# Patient Record
Sex: FEMALE | Race: BLACK OR AFRICAN AMERICAN | URBAN - METROPOLITAN AREA
[De-identification: names, ages, dates, MRNs, and addresses within clinical notes are randomized per-mention and may not be internally consistent; named-entity substitution may affect disease eponyms.]

---

## 2020-07-07 ENCOUNTER — OFFICE VISIT (OUTPATIENT)
Dept: URBAN - METROPOLITAN AREA TELEHEALTH 2 | Facility: TELEHEALTH | Age: 45
End: 2020-07-07

## 2020-07-07 RX ORDER — OMEPRAZOLE 10 MG/1
TAKE 1 CAPSULE BY MOUTH TWICE A DAY CAPSULE, DELAYED RELEASE ORAL
Qty: 60 | Refills: 3 | Status: ACTIVE | COMMUNITY
Start: 2020-05-28

## 2020-12-10 ENCOUNTER — TELEPHONE ENCOUNTER (OUTPATIENT)
Dept: URBAN - METROPOLITAN AREA CLINIC 92 | Facility: CLINIC | Age: 45
End: 2020-12-10

## 2020-12-11 ENCOUNTER — ERX REFILL RESPONSE (OUTPATIENT)
Age: 45
End: 2020-12-11

## 2020-12-11 RX ORDER — OMEPRAZOLE 10 MG/1
TAKE 1 CAPSULE BY MOUTH TWICE A DAY CAPSULE, DELAYED RELEASE ORAL
Qty: 60 | Refills: 3

## 2020-12-15 ENCOUNTER — DASHBOARD ENCOUNTERS (OUTPATIENT)
Age: 45
End: 2020-12-15

## 2020-12-15 ENCOUNTER — OFFICE VISIT (OUTPATIENT)
Dept: URBAN - METROPOLITAN AREA TELEHEALTH 2 | Facility: TELEHEALTH | Age: 45
End: 2020-12-15
Payer: MEDICARE

## 2020-12-15 DIAGNOSIS — R19.7 DIARRHEA, UNSPECIFIED TYPE: ICD-10-CM

## 2020-12-15 DIAGNOSIS — R10.13 EPIGASTRIC PAIN: ICD-10-CM

## 2020-12-15 DIAGNOSIS — R53.82 CHRONIC FATIGUE: ICD-10-CM

## 2020-12-15 DIAGNOSIS — Z98.84 BARIATRIC SURGERY STATUS: ICD-10-CM

## 2020-12-15 PROBLEM — 84229001: Status: ACTIVE | Noted: 2020-12-15

## 2020-12-15 PROBLEM — 77692006: Status: ACTIVE | Noted: 2020-12-15

## 2020-12-15 PROBLEM — 62315008: Status: ACTIVE | Noted: 2020-12-15

## 2020-12-15 PROBLEM — 287006005: Status: ACTIVE | Noted: 2020-12-15

## 2020-12-15 PROBLEM — 79922009: Status: ACTIVE | Noted: 2020-12-15

## 2020-12-15 PROCEDURE — 99214 OFFICE O/P EST MOD 30 MIN: CPT | Performed by: INTERNAL MEDICINE

## 2020-12-15 RX ORDER — OMEPRAZOLE 10 MG/1
TAKE 1 CAPSULE BY MOUTH TWICE A DAY CAPSULE, DELAYED RELEASE ORAL
Qty: 60 | Refills: 3 | COMMUNITY

## 2020-12-15 NOTE — HPI-TODAY'S VISIT:
45-year-old female with history of gastric sleeve complicated with epigastric pain dysphagia and vomiting in the past secondary to inadequate diet.  Since the last time visit 2 years ago she has been on frequent feedings with improvement of her stoma discomfort.  She is suffering from intermittent diarrhea.  She is concerned about the development of anemia that she has had in the past and vitamin B12 deficiency and low magnesium.  She also complains of severe fatigue lack of energy and hypersomnia.  May be a candidate for clarithromycin. We will order laboratory test and see her in the office in 2 months.  She also suffers from intermittent diarrhea

## 2021-01-29 ENCOUNTER — TELEPHONE ENCOUNTER (OUTPATIENT)
Dept: URBAN - METROPOLITAN AREA SURGERY CENTER 30 | Facility: SURGERY CENTER | Age: 46
End: 2021-01-29

## 2021-02-03 ENCOUNTER — LAB OUTSIDE AN ENCOUNTER (OUTPATIENT)
Dept: URBAN - METROPOLITAN AREA CLINIC 13 | Facility: CLINIC | Age: 46
End: 2021-02-03

## 2021-02-05 ENCOUNTER — TELEPHONE ENCOUNTER (OUTPATIENT)
Dept: URBAN - METROPOLITAN AREA CLINIC 92 | Facility: CLINIC | Age: 46
End: 2021-02-05

## 2021-02-21 LAB
A/G RATIO: 1
ALBUMIN: 4.1
ALKALINE PHOSPHATASE: 103
ALT (SGPT): 10
AST (SGOT): 18
BILIRUBIN, TOTAL: 0.5
BUN/CREATININE RATIO: 17
BUN: 21
CALCIUM: 9.8
CARBON DIOXIDE, TOTAL: 26
CHLORIDE: 106
CREATININE: 1.22
EGFR IF AFRICN AM: 62
EGFR IF NONAFRICN AM: 54
GLOBULIN, TOTAL: 4.2
GLUCOSE: 92
HEMATOCRIT: 37.7
HEMATOCRIT: 37.7
HEMOGLOBIN: 12.7
HEMOGLOBIN: 12.7
IRON BIND.CAP.(TIBC): 307
IRON SATURATION: 26
IRON: 81
MAGNESIUM: 2.1
MAGNESIUM: 2.1
MCH: 31.8
MCH: 31.8
MCHC: 33.7
MCHC: 33.7
MCV: 95
MCV: 95
NRBC: (no result)
NRBC: (no result)
PLATELETS: 376
PLATELETS: 376
POTASSIUM: 4.5
PROTEIN, TOTAL: 8.3
RBC: 3.99
RBC: 3.99
RDW: 14.2
RDW: 14.2
SODIUM: 143
UIBC: 226
VIT. B1, WHOLE BLOOD: 115.2
VITAMIN B12: 1188
VITAMIN D, 25-HYDROXY: 47.4
WBC: 7.6
WBC: 7.6